# Patient Record
Sex: MALE | Employment: FULL TIME | URBAN - METROPOLITAN AREA
[De-identification: names, ages, dates, MRNs, and addresses within clinical notes are randomized per-mention and may not be internally consistent; named-entity substitution may affect disease eponyms.]

---

## 2024-10-13 ENCOUNTER — OFFICE VISIT (OUTPATIENT)
Dept: URGENT CARE | Age: 49
End: 2024-10-13
Payer: COMMERCIAL

## 2024-10-13 VITALS
OXYGEN SATURATION: 100 % | DIASTOLIC BLOOD PRESSURE: 101 MMHG | WEIGHT: 215 LBS | HEART RATE: 86 BPM | RESPIRATION RATE: 18 BRPM | SYSTOLIC BLOOD PRESSURE: 140 MMHG | TEMPERATURE: 98.3 F

## 2024-10-13 DIAGNOSIS — S46.911A RIGHT SHOULDER STRAIN, INITIAL ENCOUNTER: Primary | ICD-10-CM

## 2024-10-13 PROCEDURE — 1036F TOBACCO NON-USER: CPT | Performed by: FAMILY MEDICINE

## 2024-10-13 PROCEDURE — 99203 OFFICE O/P NEW LOW 30 MIN: CPT | Performed by: FAMILY MEDICINE

## 2024-10-13 RX ORDER — LISINOPRIL AND HYDROCHLOROTHIAZIDE 20; 25 MG/1; MG/1
1 TABLET ORAL
COMMUNITY
Start: 2024-09-09

## 2024-10-13 RX ORDER — METFORMIN HYDROCHLORIDE 1000 MG/1
1000 TABLET ORAL
COMMUNITY

## 2024-10-13 RX ORDER — AMLODIPINE BESYLATE 5 MG/1
1 TABLET ORAL NIGHTLY
COMMUNITY
Start: 2024-03-07

## 2024-10-13 RX ORDER — NAPROXEN 500 MG/1
500 TABLET ORAL
Qty: 20 TABLET | Refills: 0 | Status: SHIPPED | OUTPATIENT
Start: 2024-10-13 | End: 2024-10-23

## 2024-10-13 RX ORDER — SERTRALINE HYDROCHLORIDE 50 MG/1
1 TABLET, FILM COATED ORAL DAILY
COMMUNITY
Start: 2024-05-20

## 2024-10-13 RX ORDER — ATORVASTATIN CALCIUM 20 MG/1
20 TABLET, FILM COATED ORAL DAILY
COMMUNITY

## 2024-10-13 RX ORDER — NAPROXEN 500 MG/1
500 TABLET ORAL
Qty: 20 TABLET | Refills: 0 | Status: SHIPPED | OUTPATIENT
Start: 2024-10-13 | End: 2024-10-13 | Stop reason: ENTERED-IN-ERROR

## 2024-10-13 RX ORDER — GABAPENTIN 300 MG/1
1 CAPSULE ORAL 2 TIMES DAILY
COMMUNITY
Start: 2024-03-11 | End: 2025-03-11

## 2024-10-13 NOTE — PROGRESS NOTES
Subjective   Patient ID: Spike Rucker is a 49 y.o. male. They present today with a chief complaint of Injury (Hit right shoulder on door frame 4 days ago ).    History of Present Illness  HPI  49-year-old male presents with pain in his right posterior and lateral shoulder.  Patient denies any specific injury although he may have bumped into a door frame at night when he woke up.  He states the pain is primarily over the posterior lateral right upper arm.  Pain is worse with raising his arm and extending his arm.  He has taken Tylenol and applied topical IcyHot with minimal relief.  He describes the pain as 9/10 and throbbing.  No numbness or loss of sensation.  No bruising or swelling seen over affected areas.  Past Medical History  Allergies as of 10/13/2024    (No Known Allergies)       (Not in a hospital admission)       No past medical history on file.    No past surgical history on file.     reports that he has never smoked. He has never used smokeless tobacco.    Review of Systems  Review of Systems as in history of present illness                               Objective    Vitals:    10/13/24 1110   BP: (!) 140/101   Pulse: 86   Resp: 18   Temp: 36.8 °C (98.3 °F)   SpO2: 100%   Weight: 97.5 kg (215 lb)     No LMP for male patient.    Physical Exam  General: Vitals noted, no distress. Afebrile.   Vascular: Right upper extremity warm and dry with good peripheral pulses noted  Extremities: No peripheral edema.  Mild to moderate tenderness to right posterior and lateral arm.  Patient appears to have full range of motion in right arm extension and abduction.  Approximately 6 inch decreased internal rotation on right side versus left.  Strength 5/5 bilateral and equal in upper extremity  Skin: No rash. No bruising or discoloration. No cuts or abrasions  Neuro: No focal neurologic deficits, NIH score of 0.    Procedures    Point of Care Test & Imaging Results from this visit  No results found for this visit  on 10/13/24.   No results found.    Diagnostic study results (if any) were reviewed by Travis Diaz MD.    Assessment/Plan   Allergies, medications, history, and pertinent labs/EKGs/Imaging reviewed by Travis Diaz MD.     Medical Decision Making  At time of discharge patient was clinically well-appearing and HDS for outpatient management. The patient and/or family was educated regarding diagnosis, supportive care, OTC and Rx medications. The patient and/or family was given the opportunity to ask questions prior to discharge.  They verbalized understanding of my discussion of the plans for treatment, expected course, indications to return to  or seek further evaluation in ED, and the need for timely follow up as directed.   They were provided with a work/school excuse if requested.    Orders and Diagnoses  There are no diagnoses linked to this encounter.    Medical Admin Record      Patient disposition: Home    Electronically signed by Travis Diaz MD  11:14 AM

## 2024-10-13 NOTE — LETTER
October 16, 2024     Patient: Spike Rucker   YOB: 1975   Date of Visit: 10/13/2024       To Whom It May Concern:    Spike Rucker was seen in my clinic on 10/13/2024 at 11:45 am. Please excuse Spike for his absence from work on this day to make the appointment.    If you have any questions or concerns, please don't hesitate to call.         Sincerely,         Travis Diaz MD        CC: No Recipients

## 2024-10-13 NOTE — PATIENT INSTRUCTIONS
Take medication twice a day with food as directed  May continue Tylenol and IcyHot as before  Rest arm as able but avoid use of sling  Go to ER for increasing pain  Follow-up in 5 to 7 days with PCP if no improvement

## 2025-05-25 ENCOUNTER — ANCILLARY PROCEDURE (OUTPATIENT)
Dept: URGENT CARE | Age: 50
End: 2025-05-25
Payer: COMMERCIAL

## 2025-05-25 ENCOUNTER — APPOINTMENT (OUTPATIENT)
Dept: URGENT CARE | Age: 50
End: 2025-05-25

## 2025-05-25 ENCOUNTER — OFFICE VISIT (OUTPATIENT)
Dept: URGENT CARE | Age: 50
End: 2025-05-25
Payer: COMMERCIAL

## 2025-05-25 VITALS
HEART RATE: 97 BPM | SYSTOLIC BLOOD PRESSURE: 138 MMHG | OXYGEN SATURATION: 98 % | DIASTOLIC BLOOD PRESSURE: 94 MMHG | RESPIRATION RATE: 16 BRPM | TEMPERATURE: 98 F

## 2025-05-25 DIAGNOSIS — R03.0 ELEVATED BLOOD PRESSURE READING WITHOUT DIAGNOSIS OF HYPERTENSION: ICD-10-CM

## 2025-05-25 DIAGNOSIS — S90.112A CONTUSION OF LEFT GREAT TOE WITHOUT DAMAGE TO NAIL, INITIAL ENCOUNTER: ICD-10-CM

## 2025-05-25 DIAGNOSIS — Z86.39 HX OF DIABETIC NEUROPATHY: ICD-10-CM

## 2025-05-25 DIAGNOSIS — L03.032 CELLULITIS OF GREAT TOE OF LEFT FOOT: ICD-10-CM

## 2025-05-25 DIAGNOSIS — L03.032 CELLULITIS OF GREAT TOE OF LEFT FOOT: Primary | ICD-10-CM

## 2025-05-25 DIAGNOSIS — S92.422A DISPLACED FRACTURE OF DISTAL PHALANX OF LEFT GREAT TOE, INITIAL ENCOUNTER FOR CLOSED FRACTURE: ICD-10-CM

## 2025-05-25 PROCEDURE — 73660 X-RAY EXAM OF TOE(S): CPT | Performed by: STUDENT IN AN ORGANIZED HEALTH CARE EDUCATION/TRAINING PROGRAM

## 2025-05-25 PROCEDURE — L4387 NON-PNEUM WALK BOOT PRE OTS: HCPCS | Performed by: STUDENT IN AN ORGANIZED HEALTH CARE EDUCATION/TRAINING PROGRAM

## 2025-05-25 PROCEDURE — 99214 OFFICE O/P EST MOD 30 MIN: CPT | Performed by: STUDENT IN AN ORGANIZED HEALTH CARE EDUCATION/TRAINING PROGRAM

## 2025-05-25 PROCEDURE — 1036F TOBACCO NON-USER: CPT | Performed by: STUDENT IN AN ORGANIZED HEALTH CARE EDUCATION/TRAINING PROGRAM

## 2025-05-25 PROCEDURE — E0114 CRUTCH UNDERARM PAIR NO WOOD: HCPCS | Performed by: STUDENT IN AN ORGANIZED HEALTH CARE EDUCATION/TRAINING PROGRAM

## 2025-05-25 RX ORDER — CEPHALEXIN 500 MG/1
500 CAPSULE ORAL 2 TIMES DAILY
Qty: 14 CAPSULE | Refills: 0 | Status: SHIPPED | OUTPATIENT
Start: 2025-05-25 | End: 2025-06-01

## 2025-05-25 RX ORDER — MUPIROCIN 20 MG/G
OINTMENT TOPICAL
Qty: 22 G | Refills: 0 | Status: SHIPPED | OUTPATIENT
Start: 2025-05-25 | End: 2025-06-01

## 2025-05-25 ASSESSMENT — PATIENT HEALTH QUESTIONNAIRE - PHQ9
1. LITTLE INTEREST OR PLEASURE IN DOING THINGS: NOT AT ALL
2. FEELING DOWN, DEPRESSED OR HOPELESS: NOT AT ALL
SUM OF ALL RESPONSES TO PHQ9 QUESTIONS 1 AND 2: 0

## 2025-05-25 NOTE — PATIENT INSTRUCTIONS
Advised treatment with Tylenol for symptomatic relief. Wear Ace wrap/splint for comfort/support, and follow up with primary care provider or [facility] for revaluation in 7 days. We also advised seeking immediate emergency medical attention if symptoms fail to improve, worsen or any concerning symptoms arise. Patient voiced full understanding and agreement to plan.    We discussed with the patient our clinical thoughts at this time given the above findings and clinical assessment and we had a shared decision-making conversation in a patient-centered decision-making model on how to proceed forward. The patient was instructed on the importance of a close follow-up with Primary Care Provider and other care providers. The patient was also advised that an Urgent care diagnosis is often a preliminary impression and that definitive care is often not able to be given completley in the Urgent care setting.

## 2025-05-25 NOTE — PROGRESS NOTES
"Subjective   Patient ID: Spike Rucker is a 49 y.o. male. They present today with a chief complaint of No chief complaint on file..    History of Present Illness  Spike is a 49-year-old male with history of diabetic neuropathy who presents to the urgent care for evaluation of left great toe wound with drainage without known mechanism of injury.  Patient describes on Thursday, May 22, 2025 took a shower in the morning and went to work at 9:00 AM and did not notice anything abnormal on his left toe prior to going to work and putting socks on and regular work shoes.  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  Patient denies any specific event that would have led to a wound that was later identified after going home and taking shoes and socks off.  Patient notes \"my left great toe was black and blue\" at around 8 PM on Thursday, May 22, 2025 that was noticed when removing socks and shoes off and getting home.  Patient states they sent a picture to their supervisor who advised reaching out to the nursing medical staff for Worker's Compensation and he had several questions asked and later instructed to go to a doctor's office for formal assessment and is here today for unspecified work-related injury with wound of the left great toe that most likely occurred while at work.  Patient denies any other associated symptoms or significant pain and states has underlying neuropathy due to diabetes.  Patient's concern for possible infection and is seeking evaluation reassurance and treatment options.  No other symptoms or concerns otherwise reported.    Past Medical History  Allergies as of 05/25/2025    (No Known Allergies)       Prescriptions Prior to Admission[1]     Medical History[2]    Surgical History[3]     reports that he has never smoked. He has never used smokeless tobacco.    Review of Systems  A " 10-point review of systems was performed, otherwise unremarkable unless stated in the history of present illness.                Objective    Vitals:    05/25/25 1517   BP: (!) 138/94   BP Location: Left arm   Patient Position: Sitting   BP Cuff Size: Large adult   Pulse: 97   Resp: 16   Temp: 36.7 °C (98 °F)   TempSrc: Oral   SpO2: 98%     No LMP for male patient.    Gen: Vitals noted and reviewed, no evidence of acute distress, well developed and afebrile.   Psych: Mood and affect appropriate for setting.  Head/Face: Atraumatic and normocephalic.   Neuro: No focal deficits noted.  Cardiac: Regular rate and rhythm no murmur.   Lungs: Clear to auscultation throughout, No evidence of wheezing, rhonchi or crackles. Good aeration throughout.   MSK left great toe: Soft tissue erythema with serous drainage and several bulla present.  There is also soft tissue ecchymosis without nailbed involvement or deformity.  No fluctuance or crepitus.  Indurated erythematous borders noted.  Neurovascular intact.  Full range of motion.  Underlying neuropathy present with reduced sensation peripherally.  Extremities: Symmetrical, No peripheral edema  Skin: Soft tissue erythema with indurated borders and circumferential with ecchymotic patch and several bulla present of the left great toe/hallux without fluctuant mass though there is some serosanguineous drainage noted without active bleeding and without evidence of petechiae, or open wounds/lacerations.       Point of Care Test & Imaging Results from this visit  No results found for this visit on 05/25/25.   Imaging  XR toe left 2+ views  Result Date: 5/25/2025  Comminuted intra-articular 1st proximal phalangeal fracture.   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   Soft tissue swelling. No definite evidence of osteomyelitis. If there is persistent clinical concern, MRI would be of value further evaluation.   MACRO: None   Signed by: aIn Bailey 5/25/2025 4:14 PM Dictation  workstation:   [id number]      Cardiology, Vascular, and Other Imaging  No other imaging results found for the past 2 days      Diagnostic study results (if any) were reviewed by Sandra Tidwell DO.    Assessment/Plan   Allergies, medications, history, and pertinent labs/EKGs/Imaging reviewed by Sandra Tidwell DO.     Medical Decision Making  Discussed with the patient symptoms and clinical presentation findings suggestive of cellulitis of the left great toe of unclear etiology secondary to suspected work-related injury of unclear mechanism.  We reviewed patient allergies medication and past medical history prescribe cephalexin orally for antimicrobial coverage along with mupirocin topically for antimicrobial prophylaxis.  We heavily urged patient to follow-up with primary care provider for reassessment and definitive management of underlying chronic illness and conditions. We also advised seeking immediate emergency medical attention if symptoms fail to improve, worsen or any concerning symptoms arise. Patient voiced full understanding and agreement to plan.    **Podiatry referral given. The patient was placed in a walking boot with crutches. Low threshold to go to the ED or call 911 if fever develops, severe pain or worsening redness/swelling/drainage. Advised treatment with Tylenol for symptomatic relief. Wear Ace wrap/splint for comfort/support, and follow up with primary care provider or [facility] for revaluation in 7 days.    We discussed with the patient our clinical thoughts at this time given the above findings and clinical assessment and we had a shared decision-making conversation in a patient-centered decision-making model on how to proceed forward. The patient was instructed on the importance of a close follow-up with Primary Care Provider and other care providers. The patient was also advised that an Urgent care diagnosis is often a preliminary impression and that definitive care is often not able to be given  mariela in the Urgent care setting.      Orders and Diagnoses  Diagnoses and all orders for this visit:  Cellulitis of great toe of left foot  -     XR toe left 2+ views; Future  -     cephalexin (Keflex) 500 mg capsule; Take 1 capsule (500 mg) by mouth 2 times a day for 7 days.  -     mupirocin (Bactroban) 2 % ointment; Apply topically 3 times a day for 7 days. Thin film to affected wound of foot.  -     Referral to Primary Care; Future  -     Referral to Podiatry; Future  Contusion of left great toe without damage to nail, initial encounter  -     XR toe left 2+ views; Future  -     Referral to Primary Care; Future  Hx of diabetic neuropathy  -     Referral to Primary Care; Future  -     Referral to Podiatry; Future  Elevated blood pressure reading without diagnosis of hypertension  -     Referral to Primary Care; Future  Displaced fracture of distal phalanx of left great toe, initial encounter for closed fracture      Medical Admin Record      Patient disposition: Home    Electronically signed by Sandra Tidwell DO  5:22 PM           [1] (Not in a hospital admission)   [2] No past medical history on file.  [3] No past surgical history on file.

## 2025-07-17 ENCOUNTER — APPOINTMENT (OUTPATIENT)
Facility: CLINIC | Age: 50
End: 2025-07-17